# Patient Record
Sex: FEMALE
[De-identification: names, ages, dates, MRNs, and addresses within clinical notes are randomized per-mention and may not be internally consistent; named-entity substitution may affect disease eponyms.]

---

## 2023-04-06 ENCOUNTER — HOME CARE VISIT (OUTPATIENT)
Age: 68
End: 2023-04-06

## 2023-04-06 NOTE — HOSPICE
The following standard precautions for infection control were utilized:  Mask  Patient was in bed in her bedroom. There is a great family presences in the caregiving for the patient. Patient opened up about her family story and she lost her  1.5 years ago. Kishore Urbano will continue to follow patient and family.